# Patient Record
Sex: FEMALE | Race: WHITE | NOT HISPANIC OR LATINO | Employment: FULL TIME | URBAN - METROPOLITAN AREA
[De-identification: names, ages, dates, MRNs, and addresses within clinical notes are randomized per-mention and may not be internally consistent; named-entity substitution may affect disease eponyms.]

---

## 2023-07-05 ENCOUNTER — ANCILLARY PROCEDURE (OUTPATIENT)
Dept: GENERAL RADIOLOGY | Facility: CLINIC | Age: 23
End: 2023-07-05
Attending: EMERGENCY MEDICINE
Payer: COMMERCIAL

## 2023-07-05 ENCOUNTER — OFFICE VISIT (OUTPATIENT)
Dept: URGENT CARE | Facility: URGENT CARE | Age: 23
End: 2023-07-05
Payer: COMMERCIAL

## 2023-07-05 VITALS
TEMPERATURE: 98.8 F | OXYGEN SATURATION: 99 % | SYSTOLIC BLOOD PRESSURE: 119 MMHG | HEART RATE: 77 BPM | DIASTOLIC BLOOD PRESSURE: 76 MMHG

## 2023-07-05 DIAGNOSIS — R10.9 FLANK PAIN: ICD-10-CM

## 2023-07-05 DIAGNOSIS — K59.00 CONSTIPATION, UNSPECIFIED CONSTIPATION TYPE: ICD-10-CM

## 2023-07-05 DIAGNOSIS — M54.50 ACUTE LEFT-SIDED LOW BACK PAIN WITHOUT SCIATICA: Primary | ICD-10-CM

## 2023-07-05 DIAGNOSIS — R39.15 URINARY URGENCY: ICD-10-CM

## 2023-07-05 DIAGNOSIS — M54.50 ACUTE LEFT-SIDED LOW BACK PAIN WITHOUT SCIATICA: ICD-10-CM

## 2023-07-05 LAB
ALBUMIN UR-MCNC: NEGATIVE MG/DL
APPEARANCE UR: CLEAR
BACTERIA #/AREA URNS HPF: ABNORMAL /HPF
BILIRUB UR QL STRIP: NEGATIVE
COLOR UR AUTO: YELLOW
GLUCOSE UR STRIP-MCNC: NEGATIVE MG/DL
HGB UR QL STRIP: ABNORMAL
KETONES UR STRIP-MCNC: NEGATIVE MG/DL
LEUKOCYTE ESTERASE UR QL STRIP: NEGATIVE
NITRATE UR QL: NEGATIVE
PH UR STRIP: 7 [PH] (ref 5–7)
RBC #/AREA URNS AUTO: ABNORMAL /HPF
SP GR UR STRIP: 1.02 (ref 1–1.03)
SQUAMOUS #/AREA URNS AUTO: ABNORMAL /LPF
UROBILINOGEN UR STRIP-ACNC: 0.2 E.U./DL
WBC #/AREA URNS AUTO: ABNORMAL /HPF

## 2023-07-05 PROCEDURE — 87086 URINE CULTURE/COLONY COUNT: CPT | Performed by: EMERGENCY MEDICINE

## 2023-07-05 PROCEDURE — 81001 URINALYSIS AUTO W/SCOPE: CPT | Performed by: EMERGENCY MEDICINE

## 2023-07-05 PROCEDURE — 74018 RADEX ABDOMEN 1 VIEW: CPT | Mod: TC | Performed by: RADIOLOGY

## 2023-07-05 PROCEDURE — 99204 OFFICE O/P NEW MOD 45 MIN: CPT | Performed by: EMERGENCY MEDICINE

## 2023-07-05 RX ORDER — BUSPIRONE HYDROCHLORIDE 15 MG/1
TABLET ORAL
COMMUNITY
Start: 2023-07-05

## 2023-07-05 RX ORDER — NORGESTIMATE AND ETHINYL ESTRADIOL 7DAYSX3 28
KIT ORAL
COMMUNITY
Start: 2023-06-20

## 2023-07-05 RX ORDER — METHOCARBAMOL 500 MG/1
500 TABLET, FILM COATED ORAL 3 TIMES DAILY PRN
Qty: 21 TABLET | Refills: 0 | Status: SHIPPED | OUTPATIENT
Start: 2023-07-05 | End: 2024-04-10

## 2023-07-05 RX ORDER — POLYETHYLENE GLYCOL 3350 17 G/17G
1 POWDER, FOR SOLUTION ORAL DAILY
Qty: 116 G | Refills: 0 | Status: SHIPPED | OUTPATIENT
Start: 2023-07-05 | End: 2023-07-08

## 2023-07-05 RX ORDER — LAMOTRIGINE 100 MG/1
150 TABLET ORAL
COMMUNITY
Start: 2023-07-05

## 2023-07-05 NOTE — PROGRESS NOTES
Assessment & Plan     Diagnosis:    ICD-10-CM    1. Acute left-sided low back pain without sciatica  M54.50 XR KUB     methocarbamol (ROBAXIN) 500 MG tablet      2. Urinary urgency  R39.15 Urine Culture Aerobic Bacterial - lab collect     Urine Culture Aerobic Bacterial - lab collect      3. Flank pain  R10.9 UA Macroscopic with reflex to Microscopic and Culture     UA Microscopic with Reflex to Culture     XR KUB     Urine Culture Aerobic Bacterial - lab collect     Urine Culture Aerobic Bacterial - lab collect      4. Constipation, unspecified constipation type  K59.00 polyethylene glycol (MIRALAX) 17 GM/Dose powder          Medical Decision Making:  Lady Thomas is a 23 year old female who presents with left flank and low back pain.  Associated symptoms include urinary frequency and urgency which has been more of a subacute/chronic problem.   A broad differential diagnosis was considered including diverticulitis, ureterolithiasis, tumor, colitis, cholecystitis, hydronephrosis, pneumonia, rib fracture, UTI, pyelonephritis, kidney stone amongst many other etiologies.   The workup in the Urgent Care is at this point negative.  No etiology for the patients pain is found at this point and my suspicion of an intraabdominal or intrathoracic catastrophe or other worrisome etiology is very low.  UA is without signs of infection, there is microscopic hematuria.  KUB demonstrates no obvious kidney stone, there is mild to moderate stool burden but no signs of obstruction on my read, radiology notes as per below.    Suspect likely musculoskeletal strain/sprain injury of the low back, recommended muscle relaxers, no bending, twisting or heavy lifting, follow-up with PT if no resolution.    Plan is home with flank pain recheck by primary care physician or go to ED at that time.  Go to the ED for fevers greater than 102 F, increasing pain, numbness or weakness in the legs, bowel or bladder incontinence or changes other new  symptoms develop.  Questions answered.    Clyde Crowley PA-C  [facility]    Subjective     Lady Thomas is a 23 year old female who presents to clinic today for the following health issues:  Chief Complaint   Patient presents with     Back Pain     Left lower back pain ongoing 1-2 week   Frequency, urgency urinating        HPI    Patient states for the last 1.5 weeks she has been experiencing left low back pain made worse by bending and twisting, does not radiate down her legs.  She also notes intermittent urinary frequency and urgency issues, this has been an on and off issue for much longer and she is concerned about a kidney infection.  She does not have any dysuria, hematuria, history of kidney stones.  No abdominal pain, nausea, vomiting, diarrhea or dark or bloody stools.  She has been slightly constipated, is still passing gas and has had bowel movements recently.  No numbness or weakness in the legs, bowel or bladder incontinence.      Review of Systems    See HPI    Objective      Vitals: /76   Pulse 77   Temp 98.8  F (37.1  C) (Tympanic)   SpO2 99%   Resp: 16    Patient Vitals for the past 24 hrs:   BP Temp Temp src Pulse SpO2   07/05/23 1645 119/76 98.8  F (37.1  C) Tympanic 77 99 %       Vital signs reviewed by: Clyde Crowley PA-C    Physical Exam   Constitutional: Patient is alert and cooperative. No acute distress.  Mouth: Mucous membranes are moist. Normal tongue and tonsil. Posterior oropharynx is clear.  Cardiovascular: Regular rate and rhythm  Pulmonary/Chest: Lungs are clear to auscultation throughout. Effort normal. No respiratory distress. No wheezes, rales or rhonchi.  GI: Mild left lower quadrant abdominal tenderness to palpation.  No rebound or guarding.  Bowel sounds present.   MSK: Left CVA tenderness.  No right CVA tenderness.  Left lumbar paraspinal muscle tenderness to palpation.  No midline T or L-spine tenderness to palpation.  Neurological: Alert  and oriented x3. Strength and sensation are intact and symmetric in the bilateral lower extremities.   Skin: No rash noted on visualized skin.  Psychiatric:The patient has an anxious affect. Pleasant.     Labs/Imaging:  Results for orders placed or performed in visit on 07/05/23   XR KUB     Status: None    Narrative    EXAM: XR KUB  LOCATION: [facility]  DATE: 7/5/2023    INDICATION:  Flank pain, Acute left-sided low back pain without sciatica  COMPARISON: None.      Impression    IMPRESSION: Negative abdomen. Bowel gas pattern is normal. Nothing for obstruction or free air. No evidence for renal stones. Mild stool burden.   Results for orders placed or performed in visit on 07/05/23   UA Macroscopic with reflex to Microscopic and Culture     Status: Abnormal    Specimen: Urine, Clean Catch   Result Value Ref Range    Color Urine Yellow Colorless, Straw, Light Yellow, Yellow    Appearance Urine Clear Clear    Glucose Urine Negative Negative mg/dL    Bilirubin Urine Negative Negative    Ketones Urine Negative Negative mg/dL    Specific Gravity Urine 1.020 1.003 - 1.035    Blood Urine Trace (A) Negative    pH Urine 7.0 5.0 - 7.0    Protein Albumin Urine Negative Negative mg/dL    Urobilinogen Urine 0.2 0.2, 1.0 E.U./dL    Nitrite Urine Negative Negative    Leukocyte Esterase Urine Negative Negative   UA Microscopic with Reflex to Culture     Status: Abnormal   Result Value Ref Range    Bacteria Urine Few (A) None Seen /HPF    RBC Urine 2-5 (A) 0-2 /HPF /HPF    WBC Urine 0-5 0-5 /HPF /HPF    Squamous Epithelials Urine Few (A) None Seen /LPF    Narrative    Urine Culture not indicated     Urine Culture: Pending      Clyde Crowley PA-C, July 5, 2023

## 2023-07-07 LAB — BACTERIA UR CULT: NO GROWTH

## 2024-04-10 ENCOUNTER — OFFICE VISIT (OUTPATIENT)
Dept: URGENT CARE | Facility: URGENT CARE | Age: 24
End: 2024-04-10
Payer: COMMERCIAL

## 2024-04-10 VITALS
DIASTOLIC BLOOD PRESSURE: 62 MMHG | TEMPERATURE: 97.7 F | WEIGHT: 159 LBS | RESPIRATION RATE: 18 BRPM | SYSTOLIC BLOOD PRESSURE: 116 MMHG | HEART RATE: 87 BPM | OXYGEN SATURATION: 97 %

## 2024-04-10 DIAGNOSIS — R50.9 FEVER AND CHILLS: ICD-10-CM

## 2024-04-10 DIAGNOSIS — R05.1 ACUTE COUGH: ICD-10-CM

## 2024-04-10 DIAGNOSIS — R07.0 THROAT PAIN: ICD-10-CM

## 2024-04-10 DIAGNOSIS — J06.9 VIRAL URI WITH COUGH: Primary | ICD-10-CM

## 2024-04-10 LAB
DEPRECATED S PYO AG THROAT QL EIA: NEGATIVE
FLUAV AG SPEC QL IA: NEGATIVE
FLUBV AG SPEC QL IA: NEGATIVE
GROUP A STREP BY PCR: NOT DETECTED
SARS-COV-2 RNA RESP QL NAA+PROBE: NEGATIVE

## 2024-04-10 PROCEDURE — 87651 STREP A DNA AMP PROBE: CPT | Performed by: NURSE PRACTITIONER

## 2024-04-10 PROCEDURE — 99213 OFFICE O/P EST LOW 20 MIN: CPT | Performed by: NURSE PRACTITIONER

## 2024-04-10 PROCEDURE — 87804 INFLUENZA ASSAY W/OPTIC: CPT | Performed by: NURSE PRACTITIONER

## 2024-04-10 PROCEDURE — 87635 SARS-COV-2 COVID-19 AMP PRB: CPT | Performed by: NURSE PRACTITIONER

## 2024-04-10 NOTE — PROGRESS NOTES
ICD-10-CM    1. Viral URI with cough  J06.9       2. Acute cough  R05.1 Symptomatic COVID-19 Virus (Coronavirus) by PCR Nose      3. Fever and chills  R50.9 Influenza A & B Antigen - Clinic Collect      4. Throat pain  R07.0 Streptococcus A Rapid Screen w/Reflex to PCR - Clinic Collect     Group A Streptococcus PCR Throat Swab        Rest.  Fluids.  Delsym for cough suppression at night.  Mucinex as desired during the day.  Tylenol or ibuprofen as needed for fever or pain.  Recheck in 10 days if symptoms have not improved, sooner if they worsen.  Decongestant as needed.    Red flag warning signs and when to go to the emergency room discussed.  Reviewed potential adverse reactions to medications.    Labs:  Results for orders placed or performed in visit on 04/10/24 (from the past 24 hour(s))   Influenza A & B Antigen - Clinic Collect    Specimen: Nose; Swab   Result Value Ref Range    Influenza A antigen Negative Negative    Influenza B antigen Negative Negative    Narrative    Test results must be correlated with clinical data. If necessary, results should be confirmed by a molecular assay or viral culture.   Streptococcus A Rapid Screen w/Reflex to PCR - Clinic Collect    Specimen: Throat; Swab   Result Value Ref Range    Group A Strep antigen Negative Negative       SUBJECTIVE:   Lady Thomas is a 24 year old female presenting with a chief complaint of   Chief Complaint   Patient presents with    Urgent Care    URI     Sick x 5 days and worsening - no Covid test done  Sinus pressure, congestion, HA, cough with snot, post nasal drip   Dizziness, fever and chills, sore throat and fatigue    .    Review of systems is negative except for as noted in the HPI.    OBJECTIVE  /62 (BP Location: Right arm, Patient Position: Sitting, Cuff Size: Adult Regular)   Pulse 87   Temp 97.7  F (36.5  C) (Tympanic)   Resp 18   Wt 72.1 kg (159 lb)   SpO2 97%   Breastfeeding No       GENERAL: Alert, mild  distress  SKIN: skin is clear, no rash or abnormal pigmentation  HEAD: The head is normocephalic.   EYES: The eyes are normal. The conjunctivae and cornea normal.   NECK: The neck is supple and thyroid is normal, no masses; LYMPH NODES: No adenopathy  HENT: Bilateral tympanic membranes and canals are normal, mild erythema of pharynx, clear rhinorrhea and nasal passages  LUNGS: The lung fields are clear to auscultation, no rales, rhonchi, wheezing or retractions  CV: Rhythm is regular. S1 and S2 are normal. No murmurs.  EXTREMITIES: Symmetric extremities no deformities    HOLLEY Monroy, CNP  [facility] Provider    The use of Dragon/PLYmedia dictation services may have been used to construct the content in this note; any grammatical or spelling errors are non-intentional. Please contact the author of this note directly if you are in need of any clarification.

## 2024-04-10 NOTE — LETTER
April 10, 2024      Lady Thomas  [address]   [facility] [address]        To Whom It May Concern:    Lady Thomas  was seen on 04/10/2024.  Please excuse her  until 04/12/2024 due to illness.        Sincerely,        MICHAEL VALDEZ, CNP

## 2024-06-06 ENCOUNTER — OFFICE VISIT (OUTPATIENT)
Dept: URGENT CARE | Facility: URGENT CARE | Age: 24
End: 2024-06-06
Payer: COMMERCIAL

## 2024-06-06 VITALS
SYSTOLIC BLOOD PRESSURE: 116 MMHG | DIASTOLIC BLOOD PRESSURE: 80 MMHG | TEMPERATURE: 99 F | OXYGEN SATURATION: 96 % | HEART RATE: 72 BPM

## 2024-06-06 DIAGNOSIS — N89.8 VAGINAL SORE: Primary | ICD-10-CM

## 2024-06-06 PROCEDURE — 99000 SPECIMEN HANDLING OFFICE-LAB: CPT | Performed by: PHYSICIAN ASSISTANT

## 2024-06-06 PROCEDURE — 87252 VIRUS INOCULATION TISSUE: CPT | Mod: 90 | Performed by: PHYSICIAN ASSISTANT

## 2024-06-06 PROCEDURE — 99204 OFFICE O/P NEW MOD 45 MIN: CPT | Performed by: PHYSICIAN ASSISTANT

## 2024-06-06 RX ORDER — VALACYCLOVIR HYDROCHLORIDE 1 G/1
1000 TABLET, FILM COATED ORAL 2 TIMES DAILY
Qty: 20 TABLET | Refills: 0 | Status: SHIPPED | OUTPATIENT
Start: 2024-06-06 | End: 2024-06-16

## 2024-06-06 ASSESSMENT — ENCOUNTER SYMPTOMS
FEVER: 0
DYSURIA: 0

## 2024-06-06 NOTE — PROGRESS NOTES
SUBJECTIVE:   Lady Thomas is a 24 year old female presenting with a chief complaint of   Chief Complaint   Patient presents with    Urgent Care    Vaginal Problem     2 days with irritation. Pt states she notice sores locate on her labia area. Not sure it was from shaving or herpes.        She is an established patient of [facility].  Patient presents with complaints of genital sores that started as itchy and now painful.  Patient states has 3 areas.  No history of herpes other than cold sores.      Treatment:  steroid cream    Review of Systems   Constitutional:  Negative for fever.   Genitourinary:  Positive for genital sores. Negative for dysuria and vaginal discharge.   All other systems reviewed and are negative.      Past Medical History:   Diagnosis Date    E. coli pyelonephritis      Family History   Problem Relation Age of Onset    Depression Mother     Anxiety Disorder Mother     Allergies Mother     High cholesterol Father     Other - See Comments Father         Chronic pain    Asthma Brother      Current Outpatient Medications   Medication Sig Dispense Refill    busPIRone (BUSPAR) 15 MG tablet       fexofenadine (ALLEGRA ALLERGY CHILDRENS) 30 MG tablet Take 90 mg by mouth daily       FLUoxetine (PROZAC) 20 MG capsule       lamoTRIgine (LAMICTAL) 100 MG tablet 150 mg      TRI-SPRINTEC 0.18/0.215/0.25 MG-35 MCG tablet        Social History     Tobacco Use    Smoking status: Every Day     Types: Vaping Device    Smokeless tobacco: Never    Tobacco comments:     parents smoke outside only and not in the car   Substance Use Topics    Alcohol use: No       OBJECTIVE  /80   Pulse 72   Temp 99  F (37.2  C) (Tympanic)   LMP 05/30/2024   SpO2 96%     Physical Exam  Vitals reviewed.   Constitutional:       Appearance: Normal appearance. She is normal weight.   Eyes:      Extraocular Movements: Extraocular movements intact.      Conjunctiva/sclera: Conjunctivae normal.   Cardiovascular:      Rate and  Rhythm: Normal rate.   Genitourinary:     Comments: At 12:00 at vaginal opening 3 ulcerations noted with erythema base.  Neurological:      Mental Status: She is alert.         Labs:  No results found for this or any previous visit (from the past 24 hour(s)).    ASSESSMENT:    No diagnosis found.     Medical Decision Making:    Differential Diagnosis:  Herpes, staph    Serious Comorbid Conditions:  Adult:   reviewed    PLAN:    Rx for valtrex.  Likely herpes.  No sexual activity at this time.  Discussed transmission.   Stop using topical steroids.  Viral culture pending.      Followup:    If not improving or if condition worsens, follow up with your Primary Care Provider, If not improving or if conditions worsens over the next 12-24 hours, go to the Emergency Department    There are no Patient Instructions on file for this visit.

## 2024-06-08 LAB — ORGANISM (ARUP): ABNORMAL

## 2024-06-22 ENCOUNTER — HEALTH MAINTENANCE LETTER (OUTPATIENT)
Age: 24
End: 2024-06-22

## 2025-07-09 NOTE — TELEPHONE ENCOUNTER
Outpatient Medication Detail     Disp Refills Start End YA   valACYclovir (VALTREX) 1000 mg tablet 20 tablet 0 6/6/2024 6/16/2024 --   Sig - Route: Take 1 tablet (1,000 mg) by mouth 2 times daily for 10 days - Oral   Sent to pharmacy as: valACYclovir HCl 1 GM Oral Tablet (VALTREX)   Class: E-Prescribe   Order: [id number]   E-Prescribing Status: Receipt confirmed by pharmacy (6/6/2024 12:26 PM CDT)     Associated Diagnoses    Vaginal sore [N89.8]  - Primary          Pharmacy seeking refill of valacyclovir. Rx'd at UC visit, they do not provide refills.  No active PCP on file.    Fax sent back to pharmacy - send request to PCP or patient to return to .    RT Bandar (R)

## 2025-07-12 ENCOUNTER — HEALTH MAINTENANCE LETTER (OUTPATIENT)
Age: 25
End: 2025-07-12